# Patient Record
Sex: FEMALE | Race: WHITE | NOT HISPANIC OR LATINO | URBAN - METROPOLITAN AREA
[De-identification: names, ages, dates, MRNs, and addresses within clinical notes are randomized per-mention and may not be internally consistent; named-entity substitution may affect disease eponyms.]

---

## 2018-05-14 ENCOUNTER — IMPORTED ENCOUNTER (OUTPATIENT)
Dept: URBAN - METROPOLITAN AREA CLINIC 50 | Facility: CLINIC | Age: 78
End: 2018-05-14

## 2018-05-29 ENCOUNTER — IMPORTED ENCOUNTER (OUTPATIENT)
Dept: URBAN - METROPOLITAN AREA CLINIC 50 | Facility: CLINIC | Age: 78
End: 2018-05-29

## 2018-11-27 ENCOUNTER — IMPORTED ENCOUNTER (OUTPATIENT)
Dept: URBAN - METROPOLITAN AREA CLINIC 50 | Facility: CLINIC | Age: 78
End: 2018-11-27

## 2020-08-28 ENCOUNTER — IMPORTED ENCOUNTER (OUTPATIENT)
Dept: URBAN - METROPOLITAN AREA CLINIC 50 | Facility: CLINIC | Age: 80
End: 2020-08-28

## 2021-04-18 ASSESSMENT — TONOMETRY
OD_IOP_MMHG: 16
OS_IOP_MMHG: 17

## 2021-04-18 ASSESSMENT — VISUAL ACUITY
OS_OTHER: 20/20. 20/25.
OD_CC: 20/40
OD_CC: J1+@ 16 IN
OD_OTHER: 20/25. 20/25.
OS_CC: 20/25
OS_BAT: 20/20
OD_BAT: 20/25
OS_CC: J1+@ 16 IN

## 2021-10-15 ENCOUNTER — PREPPED CHART (OUTPATIENT)
Dept: URBAN - METROPOLITAN AREA CLINIC 49 | Facility: CLINIC | Age: 81
End: 2021-10-15

## 2021-10-25 ENCOUNTER — NEW PATIENT COMPREHENSIVE (OUTPATIENT)
Dept: URBAN - METROPOLITAN AREA CLINIC 49 | Facility: CLINIC | Age: 81
End: 2021-10-25

## 2021-10-25 DIAGNOSIS — H25.13: ICD-10-CM

## 2021-10-25 PROCEDURE — 92134 CPTRZ OPH DX IMG PST SGM RTA: CPT

## 2021-10-25 PROCEDURE — 92004 COMPRE OPH EXAM NEW PT 1/>: CPT

## 2021-10-25 ASSESSMENT — VISUAL ACUITY
OS_GLARE: 20/25-1
OU_CC: J1+
OD_GLARE: 20/25
OD_GLARE: 20/25
OS_GLARE: 20/25
OD_CC: 20/30-1
OD_PH: 20/25
OS_CC: 20/25-1

## 2021-10-25 ASSESSMENT — TONOMETRY
OS_IOP_MMHG: 17
OD_IOP_MMHG: 17

## 2021-11-02 ENCOUNTER — CONTACT LENS FITTING NEW (OUTPATIENT)
Dept: URBAN - METROPOLITAN AREA CLINIC 49 | Facility: CLINIC | Age: 81
End: 2021-11-02

## 2021-11-02 DIAGNOSIS — Z97.3: ICD-10-CM

## 2021-11-02 PROCEDURE — CLF EW NO CL FIT, EW, NO ADJUSTMENT

## 2021-11-02 ASSESSMENT — VISUAL ACUITY
OD_CC: 20/30-1
OU_CC: J5-2
OS_CC: 20/40
OU_CC: 20/30-2

## 2022-10-17 NOTE — PATIENT DISCUSSION
Laurie, Thank you for choosing your Primary Care with Dr. EVANS and with [facility] As I have discussed with you,  with respect to information for Our NEW Patients  ---- I have reviewed your medical records with your Previous PCP Doctors to help with your medical care along with any outside medical records available within your [facility] health record system including if any care received in the past year/ few years.     Laurie Today we are doing laboratory testing to evaluate your routine Sugar/ blood glucose Cholesterol etc. See attached orders.    Laurie,    I will review your results and once results are available, I will review your laboratory results and once results are available, we will  Call you with your results as soon as they are available.   OK to leave a detailed voicemail ? YESCall you @    HOME: [phone]   WORK: N/A   CELL: [phone]       Laurie  If you haven’t signed up for [facility]/ myChart, please do. It’s our online tool to help you access your health information such as lab results, request an appointment with me, renew prescriptions, and more. Your information is safe and secure. [facility] is password protected and delivered via an encrypted connection.   Laurie   [facility]/ myChart also helps us Help You.     You can still sign up for [facility]/ myChart even if you had previously declined or your prior password had  or if you are having difficulty with your password::    E-mail   [email or web address]    or call [phone] to get started.     Laurie,  As I have discussed with you, Zostavax Shingles vaccination available since the past 12 years was the only shingles Preventive measures available at that time  ;  recommended by the ACIP at age 60.  NOW  the NEW ShingRix shingles vaccination recommended starting at age 50 and older  (FDA approved in end of 2017  recommended / Favored by the ACIP Advisory [place] on  Insertion and removal of CL’s taught. Immunization practices )   The Shingrix vaccination is TWO parts, at least TWO months apart and over 90% effective at Preventing Shingles.    Shingles vaccination is Covered by your commercial insurance but unfortunately not covered by Medicare B services; please consider this prior to starting Medicare if you would like your insurance to cover this vaccination.  Laurie you can check with  our pharmacy downstairs and any Pharmacy      Laurie the hepatitis C screening has been Recommended by the CDC since 2013 as a 1x screening for all adults BORN between 1622-1144.  ----  In the future for my Established patients,  I offer routine follow up PRE-Clinic labs to be completed before appointment and to be reviewed with you at time of your follow up appointment to best serve you.  I regularly go over all results as well as plan of Care to make certain you have the best education and care possible     ---- I have reserved special appointments for my patients Every Day to make certain that if you have an acute concern, you can be seen with your Primary Care Physician here within the SAME DAY of calling.      Laurie, Thank you for the opportunity to partner with you in achieving the best health possible.      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 I truly appreciate the trust you have placed in me regarding your healthcare.  Our office appreciates you entrusting us in being part of of your healthcare and strive to provide you with excellent care every day.    Thank you!  Dr. Aparna EVANS MD    Medical Doctor  Internal Medicine [facility]

## 2023-07-10 ENCOUNTER — COMPREHENSIVE EXAM (OUTPATIENT)
Dept: URBAN - METROPOLITAN AREA CLINIC 49 | Facility: CLINIC | Age: 83
End: 2023-07-10

## 2023-07-10 DIAGNOSIS — H25.13: ICD-10-CM

## 2023-07-10 DIAGNOSIS — H35.363: ICD-10-CM

## 2023-07-10 DIAGNOSIS — H52.4: ICD-10-CM

## 2023-07-10 PROCEDURE — 92014 COMPRE OPH EXAM EST PT 1/>: CPT

## 2023-07-10 PROCEDURE — 92134 CPTRZ OPH DX IMG PST SGM RTA: CPT

## 2023-07-10 PROCEDURE — 92015 DETERMINE REFRACTIVE STATE: CPT

## 2023-07-10 ASSESSMENT — VISUAL ACUITY
OD_CC: 20/25-2
OD_GLARE: 20/50
OU_CC: J2
OU_CC: 20/25
OD_GLARE: 20/50
OS_GLARE: 20/60
OS_GLARE: 20/60
OS_CC: 20/60

## 2023-07-10 ASSESSMENT — TONOMETRY
OD_IOP_MMHG: 18
OS_IOP_MMHG: 18

## 2023-07-27 ENCOUNTER — CONTACT LENSES/GLASSES VISIT (OUTPATIENT)
Dept: URBAN - METROPOLITAN AREA CLINIC 49 | Facility: CLINIC | Age: 83
End: 2023-07-27

## 2023-07-27 DIAGNOSIS — Z97.3: ICD-10-CM

## 2023-07-27 PROCEDURE — 92310-2E ESTABLISHED CL PATIENT TORIC (ASTIGMATISM) SINGLE VISION SOFT LENS EVALUATION

## 2023-07-27 ASSESSMENT — VISUAL ACUITY
OS_CC: 20/25-2
OD_PH: 20/30-2
OD_CC: 20/40+2

## 2023-08-10 ENCOUNTER — CONTACT LENSES/GLASSES VISIT (OUTPATIENT)
Dept: URBAN - METROPOLITAN AREA CLINIC 49 | Facility: LOCATION | Age: 83
End: 2023-08-10

## 2023-08-10 DIAGNOSIS — Z97.3: ICD-10-CM

## 2023-08-10 PROCEDURE — 92310N CONTACT LENS F/U, 3 OR LESS N/C

## 2023-08-10 ASSESSMENT — VISUAL ACUITY
OS_PH: 20/25-2
OU_CC: 20/40
OU_CC: J7
OS_CC: 20/40

## 2023-09-29 ENCOUNTER — PRE-OP/H&P (OUTPATIENT)
Dept: URBAN - METROPOLITAN AREA CLINIC 49 | Facility: LOCATION | Age: 83
End: 2023-09-29

## 2023-09-29 DIAGNOSIS — H35.363: ICD-10-CM

## 2023-09-29 DIAGNOSIS — H25.13: ICD-10-CM

## 2023-09-29 PROCEDURE — PREOP PRE OP VISIT

## 2023-09-29 PROCEDURE — 92025IOL CORNEAL TOPOGRAPHY PREMIUM IOL

## 2023-09-29 PROCEDURE — 92134 CPTRZ OPH DX IMG PST SGM RTA: CPT

## 2023-09-29 PROCEDURE — 92136 OPHTHALMIC BIOMETRY: CPT

## 2023-09-29 ASSESSMENT — VISUAL ACUITY
OS_CC: 20/40
OD_PH: 20/30
OD_CC: 20/40

## 2023-09-29 ASSESSMENT — KERATOMETRY
OD_AXISANGLE_DEGREES: 25
OD_K2POWER_DIOPTERS: 40.62
OS_AXISANGLE_DEGREES: 170
OS_AXISANGLE2_DEGREES: 80
OD_AXISANGLE2_DEGREES: 115
OD_K1POWER_DIOPTERS: 43.00
OS_K2POWER_DIOPTERS: 42.12
OS_K1POWER_DIOPTERS: 42.50

## 2023-09-29 ASSESSMENT — TONOMETRY
OD_IOP_MMHG: 18
OS_IOP_MMHG: 18

## 2023-10-04 ENCOUNTER — SURGERY/PROCEDURE (OUTPATIENT)
Dept: URBAN - METROPOLITAN AREA SURGERY 16 | Facility: SURGERY | Age: 83
End: 2023-10-04

## 2023-10-04 ENCOUNTER — POST-OP (OUTPATIENT)
Dept: URBAN - METROPOLITAN AREA CLINIC 53 | Facility: CLINIC | Age: 83
End: 2023-10-04

## 2023-10-04 DIAGNOSIS — Z96.1: ICD-10-CM

## 2023-10-04 DIAGNOSIS — Z98.42: ICD-10-CM

## 2023-10-04 DIAGNOSIS — H25.12: ICD-10-CM

## 2023-10-04 PROCEDURE — 66984AV REMOVE CATARACT, INSERT ADVANCED LENS

## 2023-10-04 PROCEDURE — 99199PAV ADVANCED VISION

## 2023-10-04 ASSESSMENT — KERATOMETRY
OS_K2POWER_DIOPTERS: 42.12
OD_AXISANGLE2_DEGREES: 115
OS_K1POWER_DIOPTERS: 42.50
OS_AXISANGLE_DEGREES: 170
OD_K2POWER_DIOPTERS: 40.62
OD_AXISANGLE_DEGREES: 25
OD_AXISANGLE_DEGREES: 25
OD_K1POWER_DIOPTERS: 43.00
OS_AXISANGLE_DEGREES: 170
OS_K2POWER_DIOPTERS: 42.12
OS_K1POWER_DIOPTERS: 42.50
OD_AXISANGLE2_DEGREES: 115
OS_AXISANGLE2_DEGREES: 80
OD_K2POWER_DIOPTERS: 40.62
OS_AXISANGLE2_DEGREES: 80
OD_K1POWER_DIOPTERS: 43.00

## 2023-10-04 ASSESSMENT — VISUAL ACUITY
OS_PH: 20/100
OD_CC: 20/25
OS_CC: 20/300

## 2023-10-04 ASSESSMENT — TONOMETRY: OS_IOP_MMHG: 19

## 2023-10-09 ENCOUNTER — POST-OP (OUTPATIENT)
Dept: URBAN - METROPOLITAN AREA CLINIC 53 | Facility: CLINIC | Age: 83
End: 2023-10-09

## 2023-10-09 DIAGNOSIS — H25.11: ICD-10-CM

## 2023-10-09 PROCEDURE — 99213 OFFICE O/P EST LOW 20 MIN: CPT | Mod: 24

## 2023-10-09 ASSESSMENT — KERATOMETRY
OS_AXISANGLE2_DEGREES: 80
OS_K1POWER_DIOPTERS: 42.50
OS_K2POWER_DIOPTERS: 42.12
OD_K1POWER_DIOPTERS: 43.00
OD_AXISANGLE_DEGREES: 25
OD_K2POWER_DIOPTERS: 40.62
OD_AXISANGLE2_DEGREES: 115
OS_AXISANGLE_DEGREES: 170

## 2023-10-09 ASSESSMENT — VISUAL ACUITY
OD_SC: 20/300
OD_PH: 20/80
OS_SC: 20/50
OU_SC: J1
OD_GLARE: 20/300
OD_GLARE: 20/300
OS_SC: J1
OS_PH: 20/40

## 2023-10-09 ASSESSMENT — TONOMETRY
OS_IOP_MMHG: 16
OD_IOP_MMHG: 16

## 2023-10-17 ASSESSMENT — KERATOMETRY
OD_AXISANGLE2_DEGREES: 115
OS_AXISANGLE_DEGREES: 170
OD_K1POWER_DIOPTERS: 43.00
OS_K2POWER_DIOPTERS: 42.12
OD_K2POWER_DIOPTERS: 40.62
OD_AXISANGLE_DEGREES: 25
OS_K1POWER_DIOPTERS: 42.50
OS_AXISANGLE2_DEGREES: 80

## 2023-10-18 ENCOUNTER — POST-OP (OUTPATIENT)
Dept: URBAN - METROPOLITAN AREA CLINIC 53 | Facility: CLINIC | Age: 83
End: 2023-10-18

## 2023-10-18 ENCOUNTER — SURGERY/PROCEDURE (OUTPATIENT)
Dept: URBAN - METROPOLITAN AREA SURGERY 16 | Facility: SURGERY | Age: 83
End: 2023-10-18

## 2023-10-18 DIAGNOSIS — Z96.1: ICD-10-CM

## 2023-10-18 DIAGNOSIS — Z98.41: ICD-10-CM

## 2023-10-18 DIAGNOSIS — H25.11: ICD-10-CM

## 2023-10-18 PROCEDURE — 99199PAV ADVANCED VISION

## 2023-10-18 PROCEDURE — 66984AV REMOVE CATARACT, INSERT ADVANCED LENS: Mod: 79,RT

## 2023-10-18 ASSESSMENT — TONOMETRY: OD_IOP_MMHG: 22

## 2023-10-18 ASSESSMENT — VISUAL ACUITY: OD_SC: 20/250-1

## 2023-10-27 ENCOUNTER — POST-OP (OUTPATIENT)
Dept: URBAN - METROPOLITAN AREA CLINIC 49 | Facility: LOCATION | Age: 83
End: 2023-10-27

## 2023-10-27 DIAGNOSIS — Z98.41: ICD-10-CM

## 2023-10-27 PROCEDURE — 99024 POSTOP FOLLOW-UP VISIT: CPT

## 2023-10-27 ASSESSMENT — KERATOMETRY
OS_K2POWER_DIOPTERS: 43.50
OD_AXISANGLE2_DEGREES: 16
OD_K2POWER_DIOPTERS: 42.75
OD_K1POWER_DIOPTERS: 41.25
OS_K1POWER_DIOPTERS: 43.00
OS_AXISANGLE2_DEGREES: 134
OD_AXISANGLE_DEGREES: 106
OS_AXISANGLE_DEGREES: 44

## 2023-10-27 ASSESSMENT — VISUAL ACUITY
OS_SC: J1@14"
OD_SC: 20/70
OD_SC: J1@14"
OD_PH: 20/50
OS_SC: 20/80
OS_PH: 20/30

## 2023-10-27 ASSESSMENT — TONOMETRY
OD_IOP_MMHG: 16
OS_IOP_MMHG: 17

## 2023-11-17 ENCOUNTER — POST-OP (OUTPATIENT)
Dept: URBAN - METROPOLITAN AREA CLINIC 49 | Facility: LOCATION | Age: 83
End: 2023-11-17

## 2023-11-17 DIAGNOSIS — Z96.1: ICD-10-CM

## 2023-11-17 DIAGNOSIS — Z98.42: ICD-10-CM

## 2023-11-17 DIAGNOSIS — Z98.41: ICD-10-CM

## 2023-11-17 DIAGNOSIS — H35.363: ICD-10-CM

## 2023-11-17 PROCEDURE — 92134 CPTRZ OPH DX IMG PST SGM RTA: CPT

## 2023-11-17 PROCEDURE — 99024 POSTOP FOLLOW-UP VISIT: CPT

## 2023-11-17 ASSESSMENT — KERATOMETRY
OS_K1POWER_DIOPTERS: 42.50
OS_AXISANGLE_DEGREES: 81
OD_AXISANGLE_DEGREES: 95
OD_AXISANGLE2_DEGREES: 5
OS_AXISANGLE2_DEGREES: 171
OS_K2POWER_DIOPTERS: 43.25
OD_K2POWER_DIOPTERS: 43.25
OD_K1POWER_DIOPTERS: 41.75

## 2023-11-17 ASSESSMENT — VISUAL ACUITY
OD_SC: 20/40
OU_SC: 20/50
OS_SC: 20/40-2
OS_PH: 20/30
OD_SC: 20/50
OD_SC: J2@14
OS_SC: J1+@14
OD_PH: 20/25-2
OS_SC: 20/50
OU_SC: J1+@14

## 2023-11-17 ASSESSMENT — TONOMETRY
OD_IOP_MMHG: 16
OS_IOP_MMHG: 16

## 2024-03-04 ENCOUNTER — CLINIC PROCEDURE ONLY (OUTPATIENT)
Dept: URBAN - METROPOLITAN AREA CLINIC 49 | Facility: LOCATION | Age: 84
End: 2024-03-04

## 2024-03-04 DIAGNOSIS — H26.493: ICD-10-CM

## 2024-03-04 PROCEDURE — 92014 COMPRE OPH EXAM EST PT 1/>: CPT

## 2024-03-04 PROCEDURE — 66821 AFTER CATARACT LASER SURGERY: CPT

## 2024-03-04 ASSESSMENT — TONOMETRY
OD_IOP_MMHG: 16
OS_IOP_MMHG: 16

## 2024-03-04 ASSESSMENT — VISUAL ACUITY
OD_GLARE: 20/50
OS_GLARE: 20/40
OS_SC: 20/40
OS_GLARE: 20/50
OD_GLARE: 20/50
OD_SC: 20/40

## 2024-03-04 ASSESSMENT — KERATOMETRY
OS_K2POWER_DIOPTERS: 43.25
OS_AXISANGLE_DEGREES: 81
OS_K1POWER_DIOPTERS: 42.50
OS_AXISANGLE2_DEGREES: 171
OD_AXISANGLE2_DEGREES: 5
OD_K1POWER_DIOPTERS: 41.75
OD_AXISANGLE_DEGREES: 95
OD_K2POWER_DIOPTERS: 43.25

## 2024-03-18 ENCOUNTER — CLINIC PROCEDURE ONLY (OUTPATIENT)
Dept: URBAN - METROPOLITAN AREA CLINIC 49 | Facility: LOCATION | Age: 84
End: 2024-03-18

## 2024-03-18 DIAGNOSIS — Z98.890: ICD-10-CM

## 2024-03-18 DIAGNOSIS — H26.492: ICD-10-CM

## 2024-03-18 PROCEDURE — 66821 AFTER CATARACT LASER SURGERY: CPT

## 2024-03-18 ASSESSMENT — KERATOMETRY
OD_AXISANGLE2_DEGREES: 5
OS_AXISANGLE2_DEGREES: 171
OS_AXISANGLE_DEGREES: 81
OD_K1POWER_DIOPTERS: 41.75
OD_AXISANGLE_DEGREES: 95
OS_K1POWER_DIOPTERS: 42.50
OS_K2POWER_DIOPTERS: 43.25
OD_K2POWER_DIOPTERS: 43.25

## 2024-03-18 ASSESSMENT — TONOMETRY
OD_IOP_MMHG: 14
OS_IOP_MMHG: 15
OS_IOP_MMHG: 14

## 2024-03-18 ASSESSMENT — VISUAL ACUITY
OS_PH: 20/30
OD_SC: 20/40
OS_SC: 20/40-1

## 2024-03-29 ENCOUNTER — ESTABLISHED PATIENT (OUTPATIENT)
Dept: URBAN - METROPOLITAN AREA CLINIC 49 | Facility: LOCATION | Age: 84
End: 2024-03-29

## 2024-03-29 DIAGNOSIS — H04.123: ICD-10-CM

## 2024-03-29 PROCEDURE — 99213 OFFICE O/P EST LOW 20 MIN: CPT

## 2024-03-29 ASSESSMENT — VISUAL ACUITY
OD_SC: 20/50-1
OU_SC: 20/40
OS_SC: 20/40
OD_PH: 20/40

## 2024-03-29 ASSESSMENT — TONOMETRY
OS_IOP_MMHG: 14
OD_IOP_MMHG: 14